# Patient Record
Sex: MALE | Race: WHITE | Employment: FULL TIME | URBAN - METROPOLITAN AREA
[De-identification: names, ages, dates, MRNs, and addresses within clinical notes are randomized per-mention and may not be internally consistent; named-entity substitution may affect disease eponyms.]

---

## 2017-02-25 DIAGNOSIS — I10 ESSENTIAL HYPERTENSION: Primary | ICD-10-CM

## 2017-02-27 RX ORDER — HYDROCHLOROTHIAZIDE 25 MG/1
TABLET ORAL
Qty: 90 TABLET | Refills: 0 | Status: SHIPPED | OUTPATIENT
Start: 2017-02-27 | End: 2017-05-16

## 2017-04-20 ENCOUNTER — PATIENT MESSAGE (OUTPATIENT)
Dept: FAMILY MEDICINE CLINIC | Facility: CLINIC | Age: 37
End: 2017-04-20

## 2017-05-16 DIAGNOSIS — I10 ESSENTIAL HYPERTENSION: Primary | ICD-10-CM

## 2017-05-17 RX ORDER — HYDROCHLOROTHIAZIDE 25 MG/1
TABLET ORAL
Qty: 90 TABLET | Refills: 0 | Status: SHIPPED | OUTPATIENT
Start: 2017-05-17 | End: 2017-08-12

## 2017-05-17 NOTE — TELEPHONE ENCOUNTER
Medication refilled per protocol. LOV 12/23/2016    No future appointments.     Signed Prescriptions Disp Refills    HYDROCHLOROTHIAZIDE 25 MG Oral Tab 90 tablet 0      Sig: TAKE 1 TABLET BY MOUTH DAILY        Authorizing Provider: Annamarie Srivastava

## 2017-08-12 DIAGNOSIS — I10 ESSENTIAL HYPERTENSION: ICD-10-CM

## 2017-08-14 RX ORDER — HYDROCHLOROTHIAZIDE 25 MG/1
TABLET ORAL
Qty: 90 TABLET | Refills: 1 | Status: SHIPPED | OUTPATIENT
Start: 2017-08-14 | End: 2018-02-14

## 2017-08-14 NOTE — TELEPHONE ENCOUNTER
Hypertension Medications Protocol Failed8/12 5:45 PM   - Appointment in past 6 or next 3 months    + CMP or BMP in past 12 months    + Last serum creatinine< 2.0     LOV 12/23/16, last labs 12/6/16- not noted when to return or have repeat labs.   Will you a

## 2017-12-27 ENCOUNTER — TELEPHONE (OUTPATIENT)
Dept: FAMILY MEDICINE CLINIC | Facility: CLINIC | Age: 37
End: 2017-12-27

## 2017-12-27 DIAGNOSIS — I10 ESSENTIAL HYPERTENSION: Primary | ICD-10-CM

## 2017-12-27 DIAGNOSIS — E78.5 HYPERLIPIDEMIA WITH TARGET LDL LESS THAN 130: ICD-10-CM

## 2017-12-27 NOTE — TELEPHONE ENCOUNTER
Patient is scheduled for a physical 01/26/18 and would like to have blood work done prior to appointment.  Please place orders to [facility]

## 2018-01-19 ENCOUNTER — APPOINTMENT (OUTPATIENT)
Dept: LAB | Facility: HOSPITAL | Age: 38
End: 2018-01-19
Attending: FAMILY MEDICINE
Payer: COMMERCIAL

## 2018-01-19 DIAGNOSIS — E78.5 HYPERLIPIDEMIA WITH TARGET LDL LESS THAN 130: ICD-10-CM

## 2018-01-19 DIAGNOSIS — I10 ESSENTIAL HYPERTENSION: ICD-10-CM

## 2018-01-19 LAB
ALBUMIN SERPL-MCNC: 3.9 G/DL (ref 3.5–4.8)
ALP LIVER SERPL-CCNC: 65 U/L (ref 45–117)
ALT SERPL-CCNC: 37 U/L (ref 17–63)
AST SERPL-CCNC: 17 U/L (ref 15–41)
BILIRUB SERPL-MCNC: 0.3 MG/DL (ref 0.1–2)
BUN BLD-MCNC: 23 MG/DL (ref 8–20)
CALCIUM BLD-MCNC: 9 MG/DL (ref 8.3–10.3)
CHLORIDE: 101 MMOL/L (ref 101–111)
CHOLEST SMN-MCNC: 235 MG/DL (ref ?–200)
CO2: 31 MMOL/L (ref 22–32)
CREAT BLD-MCNC: 1.28 MG/DL (ref 0.7–1.3)
GLUCOSE BLD-MCNC: 101 MG/DL (ref 70–99)
HDLC SERPL-MCNC: 43 MG/DL (ref 45–?)
HDLC SERPL: 5.47 {RATIO} (ref ?–4.97)
LDLC SERPL CALC-MCNC: 152 MG/DL (ref ?–130)
M PROTEIN MFR SERPL ELPH: 7.6 G/DL (ref 6.1–8.3)
NONHDLC SERPL-MCNC: 192 MG/DL (ref ?–130)
POTASSIUM SERPL-SCNC: 4.1 MMOL/L (ref 3.6–5.1)
SODIUM SERPL-SCNC: 138 MMOL/L (ref 136–144)
TRIGL SERPL-MCNC: 201 MG/DL (ref ?–150)
VLDLC SERPL CALC-MCNC: 40 MG/DL (ref 5–40)

## 2018-01-19 PROCEDURE — 36415 COLL VENOUS BLD VENIPUNCTURE: CPT

## 2018-01-19 PROCEDURE — 80053 COMPREHEN METABOLIC PANEL: CPT

## 2018-01-19 PROCEDURE — 80061 LIPID PANEL: CPT

## 2018-02-14 DIAGNOSIS — I10 ESSENTIAL HYPERTENSION: ICD-10-CM

## 2018-02-15 RX ORDER — HYDROCHLOROTHIAZIDE 25 MG/1
TABLET ORAL
Qty: 90 TABLET | Refills: 0 | Status: SHIPPED | OUTPATIENT
Start: 2018-02-15 | End: 2018-05-12

## 2018-03-02 ENCOUNTER — OFFICE VISIT (OUTPATIENT)
Dept: FAMILY MEDICINE CLINIC | Facility: CLINIC | Age: 38
End: 2018-03-02

## 2018-03-02 VITALS
HEART RATE: 84 BPM | SYSTOLIC BLOOD PRESSURE: 124 MMHG | TEMPERATURE: 98 F | BODY MASS INDEX: 26.46 KG/M2 | RESPIRATION RATE: 16 BRPM | WEIGHT: 189 LBS | DIASTOLIC BLOOD PRESSURE: 70 MMHG | HEIGHT: 71 IN

## 2018-03-02 DIAGNOSIS — Z82.49 FAMILY HISTORY OF HEART DISEASE: ICD-10-CM

## 2018-03-02 DIAGNOSIS — Z00.00 ANNUAL PHYSICAL EXAM: Primary | ICD-10-CM

## 2018-03-02 DIAGNOSIS — E78.5 HYPERLIPIDEMIA WITH TARGET LDL LESS THAN 130: ICD-10-CM

## 2018-03-02 DIAGNOSIS — I10 ESSENTIAL HYPERTENSION: ICD-10-CM

## 2018-03-02 PROCEDURE — 99395 PREV VISIT EST AGE 18-39: CPT | Performed by: FAMILY MEDICINE

## 2018-03-02 NOTE — PROGRESS NOTES
Patient presents with:  Physical     HPI:   Mary Lou Ash is a 40year old male who presents for a complete physical exam.     Last colonoscopy:  n/a  Last PSA:  n/a  Immunizations: TDaP 8/2014.      HLD - has been on medications in the past.  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 Will postpone right now. Consider Crestor when indicated. 3. Family history of heart disease  As above, several males. Monitoring risk factors. 4. Essential hypertension  BP controlled, stable.          Rik Roy

## 2018-05-12 DIAGNOSIS — I10 ESSENTIAL HYPERTENSION: ICD-10-CM

## 2018-05-14 RX ORDER — HYDROCHLOROTHIAZIDE 25 MG/1
TABLET ORAL
Qty: 90 TABLET | Refills: 1 | Status: SHIPPED | OUTPATIENT
Start: 2018-05-14 | End: 2018-10-16

## 2018-05-14 NOTE — TELEPHONE ENCOUNTER
Incoming request for Hydrochlorothiazide. LOV 3/2/2018 and last labs done 1/2018. No future appointments. Medication passed protocol. Medication sent.

## 2018-10-03 ENCOUNTER — TELEPHONE (OUTPATIENT)
Dept: FAMILY MEDICINE CLINIC | Facility: CLINIC | Age: 38
End: 2018-10-03

## 2018-10-03 DIAGNOSIS — E78.5 HYPERLIPIDEMIA WITH TARGET LDL LESS THAN 130: Primary | ICD-10-CM

## 2018-10-03 NOTE — TELEPHONE ENCOUNTER
Pt scheduled his physical with Dr. Sera Mata on 3/8/19 please call lab orders into [facility]. Thank you.

## 2018-10-16 DIAGNOSIS — I10 ESSENTIAL HYPERTENSION: ICD-10-CM

## 2018-10-17 RX ORDER — HYDROCHLOROTHIAZIDE 25 MG/1
TABLET ORAL
Qty: 90 TABLET | Refills: 1 | Status: SHIPPED | OUTPATIENT
Start: 2018-10-17 | End: 2019-03-08

## 2018-10-17 NOTE — TELEPHONE ENCOUNTER
Requested Prescriptions     Pending Prescriptions Disp Refills   • HYDROCHLOROTHIAZIDE 25 MG Oral Tab [Pharmacy Med Name: HYDROCHLOROTHIAZIDE 25MG TABLETS] 90 tablet 0     Sig: TAKE 1 TABLET BY MOUTH DAILY       LOV Visit 3/2/18    Appointment scheduled: 3

## 2019-03-01 ENCOUNTER — APPOINTMENT (OUTPATIENT)
Dept: LAB | Age: 39
End: 2019-03-01
Attending: FAMILY MEDICINE
Payer: COMMERCIAL

## 2019-03-01 DIAGNOSIS — E78.5 HYPERLIPIDEMIA WITH TARGET LDL LESS THAN 130: ICD-10-CM

## 2019-03-01 LAB
ALBUMIN SERPL-MCNC: 3.9 G/DL (ref 3.4–5)
ALBUMIN/GLOB SERPL: 1.1 {RATIO} (ref 1–2)
ALP LIVER SERPL-CCNC: 73 U/L (ref 45–117)
ALT SERPL-CCNC: 36 U/L (ref 16–61)
ANION GAP SERPL CALC-SCNC: 9 MMOL/L (ref 0–18)
AST SERPL-CCNC: 14 U/L (ref 15–37)
BILIRUB SERPL-MCNC: 0.4 MG/DL (ref 0.1–2)
BUN BLD-MCNC: 21 MG/DL (ref 7–18)
BUN/CREAT SERPL: 15.6 (ref 10–20)
CALCIUM BLD-MCNC: 9.1 MG/DL (ref 8.5–10.1)
CHLORIDE SERPL-SCNC: 103 MMOL/L (ref 98–107)
CHOLEST SMN-MCNC: 249 MG/DL (ref ?–200)
CO2 SERPL-SCNC: 27 MMOL/L (ref 21–32)
CREAT BLD-MCNC: 1.35 MG/DL (ref 0.7–1.3)
GLOBULIN PLAS-MCNC: 3.7 G/DL (ref 2.8–4.4)
GLUCOSE BLD-MCNC: 102 MG/DL (ref 70–99)
HDLC SERPL-MCNC: 37 MG/DL (ref 40–59)
LDLC SERPL CALC-MCNC: 162 MG/DL (ref ?–100)
M PROTEIN MFR SERPL ELPH: 7.6 G/DL (ref 6.4–8.2)
NONHDLC SERPL-MCNC: 212 MG/DL (ref ?–130)
OSMOLALITY SERPL CALC.SUM OF ELEC: 291 MOSM/KG (ref 275–295)
POTASSIUM SERPL-SCNC: 3.7 MMOL/L (ref 3.5–5.1)
SODIUM SERPL-SCNC: 139 MMOL/L (ref 136–145)
TRIGL SERPL-MCNC: 251 MG/DL (ref 30–149)
VLDLC SERPL CALC-MCNC: 50 MG/DL (ref 0–30)

## 2019-03-01 PROCEDURE — 80061 LIPID PANEL: CPT | Performed by: FAMILY MEDICINE

## 2019-03-01 PROCEDURE — 80053 COMPREHEN METABOLIC PANEL: CPT | Performed by: FAMILY MEDICINE

## 2019-03-01 PROCEDURE — 36415 COLL VENOUS BLD VENIPUNCTURE: CPT | Performed by: FAMILY MEDICINE

## 2019-03-08 ENCOUNTER — OFFICE VISIT (OUTPATIENT)
Dept: FAMILY MEDICINE CLINIC | Facility: CLINIC | Age: 39
End: 2019-03-08
Payer: COMMERCIAL

## 2019-03-08 VITALS
TEMPERATURE: 98 F | WEIGHT: 192.19 LBS | SYSTOLIC BLOOD PRESSURE: 120 MMHG | DIASTOLIC BLOOD PRESSURE: 68 MMHG | BODY MASS INDEX: 26.61 KG/M2 | HEART RATE: 72 BPM | HEIGHT: 71.25 IN

## 2019-03-08 DIAGNOSIS — Z00.00 ANNUAL PHYSICAL EXAM: Primary | ICD-10-CM

## 2019-03-08 DIAGNOSIS — Z82.49 FAMILY HISTORY OF HEART DISEASE: ICD-10-CM

## 2019-03-08 DIAGNOSIS — I10 ESSENTIAL HYPERTENSION: ICD-10-CM

## 2019-03-08 DIAGNOSIS — E78.5 HYPERLIPIDEMIA WITH TARGET LDL LESS THAN 130: ICD-10-CM

## 2019-03-08 PROCEDURE — 99395 PREV VISIT EST AGE 18-39: CPT | Performed by: FAMILY MEDICINE

## 2019-03-08 RX ORDER — ROSUVASTATIN CALCIUM 10 MG/1
10 TABLET, COATED ORAL NIGHTLY
Qty: 90 TABLET | Refills: 3 | Status: SHIPPED | OUTPATIENT
Start: 2019-03-08 | End: 2019-12-08

## 2019-03-08 RX ORDER — B-COMPLEX WITH VITAMIN C
TABLET ORAL DAILY
COMMUNITY

## 2019-03-08 RX ORDER — OMEGA-3 FATTY ACIDS CAP DELAYED RELEASE 1000 MG 1000 MG
2 CAPSULE DELAYED RELEASE ORAL DAILY
COMMUNITY
End: 2021-07-30

## 2019-03-08 RX ORDER — ASCORBIC ACID 500 MG
TABLET ORAL DAILY
COMMUNITY

## 2019-03-08 RX ORDER — HYDROCHLOROTHIAZIDE 25 MG/1
25 TABLET ORAL
Qty: 90 TABLET | Refills: 3 | Status: SHIPPED | OUTPATIENT
Start: 2019-03-08 | End: 2020-03-23

## 2019-03-08 NOTE — PROGRESS NOTES
Patient presents with:  Physical     HPI:   Mary Burnette is a 41year old male who presents for a complete physical exam.     Last colonoscopy:  N/a - no family history. At 52yo  Last PSA:  N.a - at 53yo  Immunizations: flu anually. TDaP 2014. Problem Relation Age of Onset   • Heart Disorder Father    • Lipids Father    • Hypertension Father    • Heart Disorder Maternal Grandfather    • Heart Disorder Paternal Grandfather       Social History:  Social History    Tobacco Use      Smoking status seen in the office today:  had concerns including Physical.    1. Annual physical exam  Overall well  Continue healthy diet, exercise. Cancer screening at 49.      2. Essential hypertension  BP controlled  Stable meds. - COMP METABOLIC PANEL (14);  Katty

## 2019-04-10 DIAGNOSIS — I10 ESSENTIAL HYPERTENSION: ICD-10-CM

## 2019-04-10 RX ORDER — HYDROCHLOROTHIAZIDE 25 MG/1
TABLET ORAL
Qty: 90 TABLET | Refills: 0 | OUTPATIENT
Start: 2019-04-10

## 2019-07-02 ENCOUNTER — TELEPHONE (OUTPATIENT)
Dept: FAMILY MEDICINE CLINIC | Facility: CLINIC | Age: 39
End: 2019-07-02

## 2019-07-02 DIAGNOSIS — M79.673 PAIN OF FOOT, UNSPECIFIED LATERALITY: Primary | ICD-10-CM

## 2019-07-02 NOTE — TELEPHONE ENCOUNTER
Having foot pain. Recurrent. Limits walking. Home measures not helping  Want to get him in touch with [facility]. Will place referral.     Can we get him the number and address?

## 2019-07-06 ENCOUNTER — OFFICE VISIT (OUTPATIENT)
Dept: FAMILY MEDICINE CLINIC | Facility: CLINIC | Age: 39
End: 2019-07-06
Payer: COMMERCIAL

## 2019-07-06 VITALS
HEIGHT: 72 IN | TEMPERATURE: 98 F | BODY MASS INDEX: 26.82 KG/M2 | OXYGEN SATURATION: 99 % | SYSTOLIC BLOOD PRESSURE: 160 MMHG | DIASTOLIC BLOOD PRESSURE: 90 MMHG | WEIGHT: 198 LBS | HEART RATE: 74 BPM

## 2019-07-06 DIAGNOSIS — J01.20 ACUTE ETHMOIDAL SINUSITIS, RECURRENCE NOT SPECIFIED: Primary | ICD-10-CM

## 2019-07-06 PROCEDURE — 99213 OFFICE O/P EST LOW 20 MIN: CPT | Performed by: PHYSICIAN ASSISTANT

## 2019-07-06 RX ORDER — FLUTICASONE PROPIONATE 50 MCG
2 SPRAY, SUSPENSION (ML) NASAL DAILY
Qty: 1 BOTTLE | Refills: 0 | Status: SHIPPED | OUTPATIENT
Start: 2019-07-06 | End: 2019-07-20

## 2019-07-06 RX ORDER — AMOXICILLIN AND CLAVULANATE POTASSIUM 875; 125 MG/1; MG/1
1 TABLET, FILM COATED ORAL 2 TIMES DAILY
Qty: 20 TABLET | Refills: 0 | Status: SHIPPED | OUTPATIENT
Start: 2019-07-06 | End: 2019-07-16

## 2019-07-06 NOTE — PROGRESS NOTES
CHIEF COMPLAINT:   Patient presents with:  Sinus Problem      HPI:   Paula Hernandez is a 46year old male who presents for cold symptoms for  2  weeks. Symptoms have progressed into sinus congestion and have been worsening since onset.   The patient r since quittin.8      Smokeless tobacco: Never Used      Tobacco comment: in college, very little    Alcohol use:  Yes      Alcohol/week: 2.4 oz      Types: 4 Standard drinks or equivalent per week      Frequency: 2-3 times a week      Drinks per session Refills for this Visit:  Requested Prescriptions     Signed Prescriptions Disp Refills   • Amoxicillin-Pot Clavulanate 875-125 MG Oral Tab 20 tablet 0     Sig: Take 1 tablet by mouth 2 (two) times daily for 10 days.    • Fluticasone Propionate 50 MCG/ACT Na

## 2019-08-26 ENCOUNTER — APPOINTMENT (OUTPATIENT)
Dept: LAB | Age: 39
End: 2019-08-26
Attending: FAMILY MEDICINE
Payer: COMMERCIAL

## 2019-08-26 DIAGNOSIS — Z82.49 FAMILY HISTORY OF HEART DISEASE: ICD-10-CM

## 2019-08-26 DIAGNOSIS — I10 ESSENTIAL HYPERTENSION: ICD-10-CM

## 2019-08-26 DIAGNOSIS — E78.5 HYPERLIPIDEMIA WITH TARGET LDL LESS THAN 130: ICD-10-CM

## 2019-08-26 LAB
ALBUMIN SERPL-MCNC: 4.1 G/DL (ref 3.4–5)
ALBUMIN/GLOB SERPL: 1.1 {RATIO} (ref 1–2)
ALP LIVER SERPL-CCNC: 66 U/L (ref 45–117)
ALT SERPL-CCNC: 40 U/L (ref 16–61)
ANION GAP SERPL CALC-SCNC: 8 MMOL/L (ref 0–18)
AST SERPL-CCNC: 22 U/L (ref 15–37)
BILIRUB SERPL-MCNC: 0.6 MG/DL (ref 0.1–2)
BUN BLD-MCNC: 17 MG/DL (ref 7–18)
BUN/CREAT SERPL: 12.2 (ref 10–20)
CALCIUM BLD-MCNC: 8.7 MG/DL (ref 8.5–10.1)
CHLORIDE SERPL-SCNC: 103 MMOL/L (ref 98–112)
CHOLEST SMN-MCNC: 156 MG/DL (ref ?–200)
CO2 SERPL-SCNC: 29 MMOL/L (ref 21–32)
CREAT BLD-MCNC: 1.39 MG/DL (ref 0.7–1.3)
GLOBULIN PLAS-MCNC: 3.7 G/DL (ref 2.8–4.4)
GLUCOSE BLD-MCNC: 105 MG/DL (ref 70–99)
HDLC SERPL-MCNC: 41 MG/DL (ref 40–59)
LDLC SERPL CALC-MCNC: 88 MG/DL (ref ?–100)
M PROTEIN MFR SERPL ELPH: 7.8 G/DL (ref 6.4–8.2)
NONHDLC SERPL-MCNC: 115 MG/DL (ref ?–130)
OSMOLALITY SERPL CALC.SUM OF ELEC: 292 MOSM/KG (ref 275–295)
POTASSIUM SERPL-SCNC: 4.1 MMOL/L (ref 3.5–5.1)
SODIUM SERPL-SCNC: 140 MMOL/L (ref 136–145)
TRIGL SERPL-MCNC: 135 MG/DL (ref 30–149)
VLDLC SERPL CALC-MCNC: 27 MG/DL (ref 0–30)

## 2019-08-26 PROCEDURE — 80061 LIPID PANEL: CPT | Performed by: FAMILY MEDICINE

## 2019-08-26 PROCEDURE — 36415 COLL VENOUS BLD VENIPUNCTURE: CPT | Performed by: FAMILY MEDICINE

## 2019-08-26 PROCEDURE — 80053 COMPREHEN METABOLIC PANEL: CPT | Performed by: FAMILY MEDICINE

## 2019-09-04 ENCOUNTER — LAB ENCOUNTER (OUTPATIENT)
Dept: LAB | Age: 39
End: 2019-09-04
Attending: PODIATRIST
Payer: COMMERCIAL

## 2019-09-04 DIAGNOSIS — M10.9 GOUT, UNSPECIFIED: Primary | ICD-10-CM

## 2019-09-04 LAB — URATE SERPL-MCNC: 7.2 MG/DL (ref 3.5–7.2)

## 2019-09-04 PROCEDURE — 84550 ASSAY OF BLOOD/URIC ACID: CPT

## 2019-09-04 PROCEDURE — 36415 COLL VENOUS BLD VENIPUNCTURE: CPT

## 2019-09-16 ENCOUNTER — TELEPHONE (OUTPATIENT)
Dept: FAMILY MEDICINE CLINIC | Facility: CLINIC | Age: 39
End: 2019-09-16

## 2019-09-16 RX ORDER — INDOMETHACIN 50 MG/1
CAPSULE ORAL
Refills: 0 | COMMUNITY
Start: 2019-07-02 | End: 2021-07-30

## 2019-09-16 NOTE — TELEPHONE ENCOUNTER
Patient was given Allopurinol by Dr. Bradley Townsend for a gout and patient wants to make sure it is okay to start taking this. Please contact patient to discuss.

## 2019-11-19 ENCOUNTER — PATIENT MESSAGE (OUTPATIENT)
Dept: FAMILY MEDICINE CLINIC | Facility: CLINIC | Age: 39
End: 2019-11-19

## 2019-11-19 RX ORDER — ZOLPIDEM TARTRATE 10 MG/1
10 TABLET ORAL NIGHTLY
Qty: 30 TABLET | Refills: 0 | Status: SHIPPED | OUTPATIENT
Start: 2019-11-19 | End: 2021-07-30

## 2019-11-19 NOTE — TELEPHONE ENCOUNTER
From: Helene Buckley  To: Alissa Knight MD  Sent: 11/19/2019 10:44 AM CST  Subject: Prescription Question    Hi Dr. Chencho Buck,     I have some international travel coming up and will need help getting my sleep schedule on track when I arrive at my de

## 2019-12-08 DIAGNOSIS — E78.5 HYPERLIPIDEMIA WITH TARGET LDL LESS THAN 130: ICD-10-CM

## 2019-12-08 DIAGNOSIS — Z82.49 FAMILY HISTORY OF HEART DISEASE: ICD-10-CM

## 2019-12-09 RX ORDER — ROSUVASTATIN CALCIUM 10 MG/1
TABLET, COATED ORAL
Qty: 90 TABLET | Refills: 0 | Status: SHIPPED | OUTPATIENT
Start: 2019-12-09 | End: 2020-06-01

## 2019-12-09 NOTE — TELEPHONE ENCOUNTER
Requested Prescriptions     Pending Prescriptions Disp Refills   • ROSUVASTATIN CALCIUM 10 MG Oral Tab [Pharmacy Med Name: ROSUVASTATIN 10MG TABLETS] 90 tablet 0     Sig: TAKE 1 TABLET(10 MG) BY MOUTH EVERY NIGHT     LOV 3/8/2019     Patient was asked to f

## 2020-02-19 ENCOUNTER — TELEPHONE (OUTPATIENT)
Dept: FAMILY MEDICINE CLINIC | Facility: CLINIC | Age: 40
End: 2020-02-19

## 2020-02-19 DIAGNOSIS — E78.5 HYPERLIPIDEMIA WITH TARGET LDL LESS THAN 130: Primary | ICD-10-CM

## 2020-02-19 DIAGNOSIS — Z87.39 HISTORY OF GOUT: ICD-10-CM

## 2020-02-19 DIAGNOSIS — I10 ESSENTIAL HYPERTENSION: ICD-10-CM

## 2020-03-22 DIAGNOSIS — I10 ESSENTIAL HYPERTENSION: ICD-10-CM

## 2020-03-23 ENCOUNTER — TELEPHONE (OUTPATIENT)
Dept: FAMILY MEDICINE CLINIC | Facility: CLINIC | Age: 40
End: 2020-03-23

## 2020-03-23 RX ORDER — HYDROCHLOROTHIAZIDE 25 MG/1
TABLET ORAL
Qty: 90 TABLET | Refills: 0 | Status: SHIPPED | OUTPATIENT
Start: 2020-03-23 | End: 2020-06-16

## 2020-03-23 NOTE — TELEPHONE ENCOUNTER
Requested Prescriptions     Pending Prescriptions Disp Refills   • HYDROCHLOROTHIAZIDE 25 MG Oral Tab [Pharmacy Med Name: HYDROCHLOROTHIAZIDE 25MG TABLETS] 90 tablet 3     Sig: TAKE 1 TABLET(25 MG) BY MOUTH EVERY DAY     LOV Visit date not found         Ap

## 2020-05-31 DIAGNOSIS — E78.5 HYPERLIPIDEMIA WITH TARGET LDL LESS THAN 130: ICD-10-CM

## 2020-05-31 DIAGNOSIS — Z82.49 FAMILY HISTORY OF HEART DISEASE: ICD-10-CM

## 2020-06-01 RX ORDER — ROSUVASTATIN CALCIUM 10 MG/1
TABLET, COATED ORAL
Qty: 90 TABLET | Refills: 0 | Status: SHIPPED | OUTPATIENT
Start: 2020-06-01 | End: 2020-08-25

## 2020-06-15 ENCOUNTER — APPOINTMENT (OUTPATIENT)
Dept: LAB | Age: 40
End: 2020-06-15
Attending: FAMILY MEDICINE
Payer: COMMERCIAL

## 2020-06-15 DIAGNOSIS — E78.5 HYPERLIPIDEMIA WITH TARGET LDL LESS THAN 130: ICD-10-CM

## 2020-06-15 DIAGNOSIS — Z87.39 HISTORY OF GOUT: ICD-10-CM

## 2020-06-15 DIAGNOSIS — I10 ESSENTIAL HYPERTENSION: ICD-10-CM

## 2020-06-15 PROCEDURE — 80061 LIPID PANEL: CPT

## 2020-06-15 PROCEDURE — 84550 ASSAY OF BLOOD/URIC ACID: CPT

## 2020-06-15 PROCEDURE — 80053 COMPREHEN METABOLIC PANEL: CPT

## 2020-06-16 DIAGNOSIS — I10 ESSENTIAL HYPERTENSION: ICD-10-CM

## 2020-06-16 RX ORDER — HYDROCHLOROTHIAZIDE 25 MG/1
TABLET ORAL
Qty: 90 TABLET | Refills: 0 | Status: SHIPPED | OUTPATIENT
Start: 2020-06-16 | End: 2020-09-14

## 2020-06-16 NOTE — TELEPHONE ENCOUNTER
Requested Prescriptions     Pending Prescriptions Disp Refills   • HYDROCHLOROTHIAZIDE 25 MG Oral Tab [Pharmacy Med Name: HYDROCHLOROTHIAZIDE 25MG TABLETS] 90 tablet 0     Sig: TAKE 1 TABLET(25 MG) BY MOUTH EVERY DAY     LOV 3/8/2019    Patient was asked t

## 2020-06-19 ENCOUNTER — OFFICE VISIT (OUTPATIENT)
Dept: FAMILY MEDICINE CLINIC | Facility: CLINIC | Age: 40
End: 2020-06-19
Payer: COMMERCIAL

## 2020-06-19 VITALS
HEART RATE: 83 BPM | SYSTOLIC BLOOD PRESSURE: 128 MMHG | DIASTOLIC BLOOD PRESSURE: 70 MMHG | WEIGHT: 196 LBS | TEMPERATURE: 98 F | RESPIRATION RATE: 18 BRPM | HEIGHT: 71.26 IN | BODY MASS INDEX: 27.14 KG/M2

## 2020-06-19 DIAGNOSIS — I10 ESSENTIAL HYPERTENSION: ICD-10-CM

## 2020-06-19 DIAGNOSIS — Z00.00 ANNUAL PHYSICAL EXAM: Primary | ICD-10-CM

## 2020-06-19 DIAGNOSIS — Z87.39 HISTORY OF GOUT: ICD-10-CM

## 2020-06-19 DIAGNOSIS — E78.5 HYPERLIPIDEMIA WITH TARGET LDL LESS THAN 130: ICD-10-CM

## 2020-06-19 PROCEDURE — 99395 PREV VISIT EST AGE 18-39: CPT | Performed by: FAMILY MEDICINE

## 2020-06-19 NOTE — PROGRESS NOTES
Patient presents with:  Physical: Annual. No concerns      HPI:   Matteo Cao is a 46year old male who presents for a complete physical exam.     Last colonoscopy:  N/a - at 51  Last PSA:  N/a - at 49. Uncle recently diagnosed with prostate CA. (Patient not taking: Reported on 6/19/2020 ) 30 tablet 0   • indomethacin 50 MG Oral Cap   0      Past Medical History:   Diagnosis Date   • Essential hypertension    • Hyperlipidemia       Past Surgical History:   Procedure Laterality Date   • OTHER  2/20 click, rub or gallop, regular rate and rhythm  Abdomen: soft, non-tender; bowel sounds normal; no masses,  no organomegaly  Extremities: extremities normal, atraumatic, no cyanosis or edema  Pulses: 2+ and symmetric  Neurologic: Alert and oriented X 3, nor

## 2020-08-25 DIAGNOSIS — E78.5 HYPERLIPIDEMIA WITH TARGET LDL LESS THAN 130: ICD-10-CM

## 2020-08-25 DIAGNOSIS — Z82.49 FAMILY HISTORY OF HEART DISEASE: ICD-10-CM

## 2020-08-25 RX ORDER — ROSUVASTATIN CALCIUM 10 MG/1
TABLET, COATED ORAL
Qty: 90 TABLET | Refills: 0 | Status: SHIPPED | OUTPATIENT
Start: 2020-08-25 | End: 2020-11-16

## 2020-08-25 NOTE — TELEPHONE ENCOUNTER
Requested Prescriptions     Pending Prescriptions Disp Refills   • ROSUVASTATIN CALCIUM 10 MG Oral Tab [Pharmacy Med Name: ROSUVASTATIN 10MG TABLETS] 90 tablet 0     Sig: TAKE 1 TABLET(10 MG) BY MOUTH EVERY NIGHT     LOV 6/19/2020         Appointment sched

## 2020-09-11 DIAGNOSIS — I10 ESSENTIAL HYPERTENSION: ICD-10-CM

## 2020-09-14 RX ORDER — HYDROCHLOROTHIAZIDE 25 MG/1
TABLET ORAL
Qty: 90 TABLET | Refills: 0 | Status: SHIPPED | OUTPATIENT
Start: 2020-09-14 | End: 2020-12-04

## 2020-09-14 NOTE — TELEPHONE ENCOUNTER
Requested Prescriptions     Signed Prescriptions Disp Refills   • HYDROCHLOROTHIAZIDE 25 MG Oral Tab 90 tablet 0     Sig: TAKE 1 TABLET(25 MG) BY MOUTH EVERY DAY     Authorizing Provider: Dylan Jovel     Ordering User: Marvin Jo 6/19

## 2020-11-16 DIAGNOSIS — E78.5 HYPERLIPIDEMIA WITH TARGET LDL LESS THAN 130: ICD-10-CM

## 2020-11-16 DIAGNOSIS — Z82.49 FAMILY HISTORY OF HEART DISEASE: ICD-10-CM

## 2020-11-16 RX ORDER — ROSUVASTATIN CALCIUM 10 MG/1
TABLET, COATED ORAL
Qty: 90 TABLET | Refills: 0 | Status: SHIPPED | OUTPATIENT
Start: 2020-11-16 | End: 2021-02-13

## 2020-11-16 NOTE — TELEPHONE ENCOUNTER
Requested Prescriptions     Pending Prescriptions Disp Refills   • ROSUVASTATIN CALCIUM 10 MG Oral Tab [Pharmacy Med Name: ROSUVASTATIN 10MG TABLETS] 90 tablet 0     Sig: TAKE 1 TABLET(10 MG) BY MOUTH EVERY NIGHT     LOV 6/19/2020     Patient was asked to

## 2020-12-03 DIAGNOSIS — I10 ESSENTIAL HYPERTENSION: ICD-10-CM

## 2020-12-04 RX ORDER — HYDROCHLOROTHIAZIDE 25 MG/1
TABLET ORAL
Qty: 90 TABLET | Refills: 1 | Status: SHIPPED | OUTPATIENT
Start: 2020-12-04 | End: 2021-05-27

## 2020-12-04 NOTE — TELEPHONE ENCOUNTER
Requested Prescriptions     Pending Prescriptions Disp Refills   • HYDROCHLOROTHIAZIDE 25 MG Oral Tab [Pharmacy Med Name: HYDROCHLOROTHIAZIDE 25MG TABLETS] 90 tablet 0     Sig: TAKE 1 TABLET(25 MG) BY MOUTH EVERY DAY     LOV 6/19/2020     Patient was asked

## 2021-02-13 DIAGNOSIS — E78.5 HYPERLIPIDEMIA WITH TARGET LDL LESS THAN 130: ICD-10-CM

## 2021-02-13 DIAGNOSIS — Z82.49 FAMILY HISTORY OF HEART DISEASE: ICD-10-CM

## 2021-02-13 RX ORDER — ROSUVASTATIN CALCIUM 10 MG/1
TABLET, COATED ORAL
Qty: 90 TABLET | Refills: 2 | Status: SHIPPED | OUTPATIENT
Start: 2021-02-13 | End: 2021-11-15

## 2021-03-23 ENCOUNTER — PATIENT MESSAGE (OUTPATIENT)
Dept: FAMILY MEDICINE CLINIC | Facility: CLINIC | Age: 41
End: 2021-03-23

## 2021-03-23 DIAGNOSIS — Z30.09 VASECTOMY EVALUATION: Primary | ICD-10-CM

## 2021-03-23 NOTE — TELEPHONE ENCOUNTER
LOV 6/19/2020 and at that time, pt was advised to follow up in one year. No future appointments. Pt is requesting a referral to get a vasectomy.     Routed to Dr Jolene Rios

## 2021-03-23 NOTE — TELEPHONE ENCOUNTER
From: Apurva Buckley  To: Joleen Duggan MD  Sent: 3/23/2021 4:52 PM CDT  Subject: Referral Request    Hi Dr. Moises Bundy like to get a vasectomy. Would you be able to give me a referral or let me know of a good place / doctor to get one from?

## 2021-04-13 ENCOUNTER — OFFICE VISIT (OUTPATIENT)
Dept: SURGERY | Facility: CLINIC | Age: 41
End: 2021-04-13
Payer: COMMERCIAL

## 2021-04-13 VITALS — SYSTOLIC BLOOD PRESSURE: 130 MMHG | TEMPERATURE: 97 F | HEART RATE: 79 BPM | DIASTOLIC BLOOD PRESSURE: 78 MMHG

## 2021-04-13 DIAGNOSIS — Z30.09 STERILIZATION CONSULT: Primary | ICD-10-CM

## 2021-04-13 PROCEDURE — 3078F DIAST BP <80 MM HG: CPT | Performed by: UROLOGY

## 2021-04-13 PROCEDURE — 99203 OFFICE O/P NEW LOW 30 MIN: CPT | Performed by: UROLOGY

## 2021-04-13 PROCEDURE — 3075F SYST BP GE 130 - 139MM HG: CPT | Performed by: UROLOGY

## 2021-04-13 NOTE — PROGRESS NOTES
Rooming Clinician:     Mary Lou Ash is a 45year old male.   Patient presents with:  Consult: vasectomy discussion    Vasectomy Consult:  Number of children: 3  Birth Control: Yes  Discussed with spouse: Yes      HPI:       [address] 130/78 (BP Location: Right arm, Patient Position: Sitting, Cuff Size: large)   Pulse 79   Temp 97.3 °F (36.3 °C) (Temporal)   GENERAL: well developed, well nourished,in no apparent distress  SKIN: no rashes,no suspicious lesions  HEENT: atraumatic, normoce Absorbable sutures are placed in the skin. Similar procedure is performed on the opposite side. The procedure generally takes under 30 minutes.   I told the patient he should plan on resting for at least 48 hours afterwards and intermittently place ice pa

## 2021-04-20 ENCOUNTER — TELEPHONE (OUTPATIENT)
Dept: SURGERY | Facility: CLINIC | Age: 41
End: 2021-04-20

## 2021-04-20 NOTE — TELEPHONE ENCOUNTER
Per pt asking for codes and phone number needed to get a price estimate for his vasectomy. Pt asking to call and put into [facility].  Please advise

## 2021-05-05 ENCOUNTER — TELEPHONE (OUTPATIENT)
Dept: SURGERY | Facility: CLINIC | Age: 41
End: 2021-05-05

## 2021-05-05 NOTE — TELEPHONE ENCOUNTER
Patient is scheduled for in office vasectomy CPT [phone] on Friday, May 7th. Per [facility] [phone] CPT 92448 rendered in office does not require prior authorization. Individual deductible is $600.00 - 140.41 accumulation has been met. Once deductible reached - 10% co insurance for patient. Phoned patient and advised of out of pocket cost. Patient acknowledged and wants to proceed with procedure.

## 2021-05-07 ENCOUNTER — PROCEDURE (OUTPATIENT)
Dept: SURGERY | Facility: CLINIC | Age: 41
End: 2021-05-07
Payer: COMMERCIAL

## 2021-05-07 VITALS — SYSTOLIC BLOOD PRESSURE: 167 MMHG | TEMPERATURE: 97 F | DIASTOLIC BLOOD PRESSURE: 100 MMHG | HEART RATE: 111 BPM

## 2021-05-07 DIAGNOSIS — Z98.52 STATUS POST VASECTOMY: Primary | ICD-10-CM

## 2021-05-07 DIAGNOSIS — Z30.2 ENCOUNTER FOR STERILIZATION: ICD-10-CM

## 2021-05-07 PROCEDURE — 55250 REMOVAL OF SPERM DUCT(S): CPT | Performed by: UROLOGY

## 2021-05-07 PROCEDURE — 3077F SYST BP >= 140 MM HG: CPT | Performed by: UROLOGY

## 2021-05-07 PROCEDURE — 3080F DIAST BP >= 90 MM HG: CPT | Performed by: UROLOGY

## 2021-05-07 RX ORDER — HYDROCODONE BITARTRATE AND ACETAMINOPHEN 5; 325 MG/1; MG/1
1-2 TABLET ORAL EVERY 4 HOURS PRN
Qty: 20 TABLET | Refills: 0 | Status: SHIPPED | OUTPATIENT
Start: 2021-05-07 | End: 2021-07-30

## 2021-05-07 NOTE — PROGRESS NOTES
The patient had been previously counseled and examined. The procedure of male sterilization was thoroughly discussed with the patient including alternatives, risks, benefits and complications and he understands and wishes to proceed.     The patient was br

## 2021-05-26 ENCOUNTER — TELEPHONE (OUTPATIENT)
Dept: FAMILY MEDICINE CLINIC | Facility: CLINIC | Age: 41
End: 2021-05-26

## 2021-05-26 DIAGNOSIS — I10 ESSENTIAL HYPERTENSION: ICD-10-CM

## 2021-05-27 ENCOUNTER — TELEPHONE (OUTPATIENT)
Dept: FAMILY MEDICINE CLINIC | Facility: CLINIC | Age: 41
End: 2021-05-27

## 2021-05-27 DIAGNOSIS — E78.5 HYPERLIPIDEMIA WITH TARGET LDL LESS THAN 130: ICD-10-CM

## 2021-05-27 DIAGNOSIS — Z87.39 HISTORY OF GOUT: ICD-10-CM

## 2021-05-27 DIAGNOSIS — I10 ESSENTIAL HYPERTENSION: Primary | ICD-10-CM

## 2021-05-27 RX ORDER — HYDROCHLOROTHIAZIDE 25 MG/1
TABLET ORAL
Qty: 90 TABLET | Refills: 0 | Status: SHIPPED | OUTPATIENT
Start: 2021-05-27 | End: 2021-08-25

## 2021-05-27 NOTE — TELEPHONE ENCOUNTER
Please enter lab orders for the patient's upcoming physical appointment. Physical scheduled:    Your appointments     Date & Time Appointment Department [facility])    Jul 30, 2021  8:30 AM CDT Physical - Established with Rickie Christy  [facility] [place]

## 2021-05-27 NOTE — TELEPHONE ENCOUNTER
Pt returned call advised of notes. Pt scheduled 07/30/21 @ 8:30 am with Dr. Bronson Horta. Pt states he will need medication to get him to appointment pt only has three tablets left.

## 2021-05-27 NOTE — TELEPHONE ENCOUNTER
Called pt advised labs have been ordered. Advised labs are fasting and to fast for 10-12 hours. Pt verbalized understanding.

## 2021-07-22 ENCOUNTER — LAB ENCOUNTER (OUTPATIENT)
Dept: LAB | Age: 41
End: 2021-07-22
Attending: FAMILY MEDICINE
Payer: COMMERCIAL

## 2021-07-22 DIAGNOSIS — Z87.39 HISTORY OF GOUT: ICD-10-CM

## 2021-07-22 DIAGNOSIS — I10 ESSENTIAL HYPERTENSION: ICD-10-CM

## 2021-07-22 DIAGNOSIS — E78.5 HYPERLIPIDEMIA WITH TARGET LDL LESS THAN 130: ICD-10-CM

## 2021-07-22 LAB
ALBUMIN SERPL-MCNC: 4 G/DL (ref 3.4–5)
ALBUMIN/GLOB SERPL: 1.1 {RATIO} (ref 1–2)
ALP LIVER SERPL-CCNC: 74 U/L
ALT SERPL-CCNC: 45 U/L
ANION GAP SERPL CALC-SCNC: 6 MMOL/L (ref 0–18)
AST SERPL-CCNC: 22 U/L (ref 15–37)
BILIRUB SERPL-MCNC: 0.3 MG/DL (ref 0.1–2)
BUN BLD-MCNC: 19 MG/DL (ref 7–18)
BUN/CREAT SERPL: 15.1 (ref 10–20)
CALCIUM BLD-MCNC: 8.8 MG/DL (ref 8.5–10.1)
CHLORIDE SERPL-SCNC: 104 MMOL/L (ref 98–112)
CHOLEST SMN-MCNC: 168 MG/DL (ref ?–200)
CO2 SERPL-SCNC: 27 MMOL/L (ref 21–32)
CREAT BLD-MCNC: 1.26 MG/DL
GLOBULIN PLAS-MCNC: 3.6 G/DL (ref 2.8–4.4)
GLUCOSE BLD-MCNC: 102 MG/DL (ref 70–99)
HDLC SERPL-MCNC: 41 MG/DL (ref 40–59)
LDLC SERPL CALC-MCNC: 98 MG/DL (ref ?–100)
M PROTEIN MFR SERPL ELPH: 7.6 G/DL (ref 6.4–8.2)
NONHDLC SERPL-MCNC: 127 MG/DL (ref ?–130)
OSMOLALITY SERPL CALC.SUM OF ELEC: 286 MOSM/KG (ref 275–295)
PATIENT FASTING Y/N/NP: YES
PATIENT FASTING Y/N/NP: YES
POTASSIUM SERPL-SCNC: 4 MMOL/L (ref 3.5–5.1)
SODIUM SERPL-SCNC: 137 MMOL/L (ref 136–145)
TRIGL SERPL-MCNC: 166 MG/DL (ref 30–149)
URATE SERPL-MCNC: 7.5 MG/DL
VLDLC SERPL CALC-MCNC: 28 MG/DL (ref 0–30)

## 2021-07-22 PROCEDURE — 80061 LIPID PANEL: CPT

## 2021-07-22 PROCEDURE — 80053 COMPREHEN METABOLIC PANEL: CPT

## 2021-07-22 PROCEDURE — 36415 COLL VENOUS BLD VENIPUNCTURE: CPT

## 2021-07-22 PROCEDURE — 84550 ASSAY OF BLOOD/URIC ACID: CPT

## 2021-07-30 ENCOUNTER — OFFICE VISIT (OUTPATIENT)
Dept: FAMILY MEDICINE CLINIC | Facility: CLINIC | Age: 41
End: 2021-07-30
Payer: COMMERCIAL

## 2021-07-30 VITALS
HEIGHT: 71.25 IN | DIASTOLIC BLOOD PRESSURE: 72 MMHG | TEMPERATURE: 98 F | RESPIRATION RATE: 16 BRPM | OXYGEN SATURATION: 99 % | SYSTOLIC BLOOD PRESSURE: 120 MMHG | HEART RATE: 74 BPM | WEIGHT: 187.63 LBS | BODY MASS INDEX: 25.98 KG/M2

## 2021-07-30 DIAGNOSIS — I10 ESSENTIAL HYPERTENSION: ICD-10-CM

## 2021-07-30 DIAGNOSIS — R73.01 ELEVATED FASTING GLUCOSE: ICD-10-CM

## 2021-07-30 DIAGNOSIS — E78.5 HYPERLIPIDEMIA WITH TARGET LDL LESS THAN 130: ICD-10-CM

## 2021-07-30 DIAGNOSIS — Z00.00 ANNUAL PHYSICAL EXAM: Primary | ICD-10-CM

## 2021-07-30 PROCEDURE — 99396 PREV VISIT EST AGE 40-64: CPT | Performed by: FAMILY MEDICINE

## 2021-07-30 PROCEDURE — 3078F DIAST BP <80 MM HG: CPT | Performed by: FAMILY MEDICINE

## 2021-07-30 PROCEDURE — 3074F SYST BP LT 130 MM HG: CPT | Performed by: FAMILY MEDICINE

## 2021-07-30 PROCEDURE — 3008F BODY MASS INDEX DOCD: CPT | Performed by: FAMILY MEDICINE

## 2021-07-30 NOTE — PROGRESS NOTES
Patient presents with:  Physical: Annual      HPI:   Ortega Cameron is a 37year old male who presents for a complete physical exam.     Last colonoscopy:  N/a - screening at 47yo   Last PSA:  N/a - screening at 51   Immunizations: COVID UTD x 2.  Fl Problem Relation Age of Onset   • Heart Disorder Father    • Lipids Father    • Hypertension Father    • Heart Disorder Maternal Grandfather    • Heart Disorder Paternal Grandfather       Social History:  Social History    Tobacco Use      Smoking status ASSESSMENT AND PLAN:     Nellydana Cortes was seen in the office today:  had concerns including Physical (Annual ).     1. Annual physical exam  Overall well  Colon cancer screening at 40  Immunizations up-to-date  Continue to watch diet and exercis

## 2021-08-03 ENCOUNTER — LAB ENCOUNTER (OUTPATIENT)
Dept: LAB | Facility: HOSPITAL | Age: 41
End: 2021-08-03
Attending: UROLOGY
Payer: COMMERCIAL

## 2021-08-03 DIAGNOSIS — Z98.52 STATUS POST VASECTOMY: ICD-10-CM

## 2021-08-03 PROCEDURE — 89310 SEMEN ANALYSIS W/COUNT: CPT

## 2021-08-25 DIAGNOSIS — I10 ESSENTIAL HYPERTENSION: ICD-10-CM

## 2021-08-25 RX ORDER — HYDROCHLOROTHIAZIDE 25 MG/1
TABLET ORAL
Qty: 90 TABLET | Refills: 3 | Status: SHIPPED | OUTPATIENT
Start: 2021-08-25

## 2021-08-25 NOTE — TELEPHONE ENCOUNTER
Requested Prescriptions     Pending Prescriptions Disp Refills   • HYDROCHLOROTHIAZIDE 25 MG Oral Tab [Pharmacy Med Name: HYDROCHLOROTHIAZIDE 25MG TABLETS] 90 tablet 0     Sig: TAKE 1 TABLET(25 MG) BY MOUTH EVERY DAY     LOV 7/30/2021     Patient was asked
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
## 2021-11-12 DIAGNOSIS — Z82.49 FAMILY HISTORY OF HEART DISEASE: ICD-10-CM

## 2021-11-12 DIAGNOSIS — E78.5 HYPERLIPIDEMIA WITH TARGET LDL LESS THAN 130: ICD-10-CM

## 2021-11-15 RX ORDER — ROSUVASTATIN CALCIUM 10 MG/1
TABLET, COATED ORAL
Qty: 90 TABLET | Refills: 2 | Status: SHIPPED | OUTPATIENT
Start: 2021-11-15

## 2021-11-15 NOTE — TELEPHONE ENCOUNTER
Requested Prescriptions     Pending Prescriptions Disp Refills   • ROSUVASTATIN 10 MG Oral Tab [Pharmacy Med Name: ROSUVASTATIN 10MG TABLETS] 90 tablet 2     Sig: TAKE 1 TABLET(10 MG) BY MOUTH EVERY NIGHT     LOV 7/30/2021     Patient was asked to follow-u

## 2022-03-02 ENCOUNTER — TELEPHONE (OUTPATIENT)
Dept: FAMILY MEDICINE CLINIC | Facility: CLINIC | Age: 42
End: 2022-03-02

## 2022-03-02 RX ORDER — ROSUVASTATIN CALCIUM 10 MG/1
10 TABLET, COATED ORAL NIGHTLY
Qty: 90 TABLET | Refills: 2 | Status: SHIPPED | OUTPATIENT
Start: 2022-03-02

## 2022-03-02 RX ORDER — HYDROCHLOROTHIAZIDE 25 MG/1
25 TABLET ORAL DAILY
Qty: 90 TABLET | Refills: 3 | Status: SHIPPED | OUTPATIENT
Start: 2022-03-02

## 2022-03-02 NOTE — TELEPHONE ENCOUNTER
Pt called he is calling because he has a new prescription sent to [facility] mail order for his new insurance for medications:    Hydrochlorothiazide 25 mg  Rosuvastatin 10 mg

## 2022-04-25 ENCOUNTER — OFFICE VISIT (OUTPATIENT)
Dept: SURGERY | Facility: CLINIC | Age: 42
End: 2022-04-25
Payer: COMMERCIAL

## 2022-04-25 DIAGNOSIS — R82.90 URINE FINDING: Primary | ICD-10-CM

## 2022-04-25 DIAGNOSIS — S30.22XA CONTUSION OF SCROTUM, INITIAL ENCOUNTER: ICD-10-CM

## 2022-04-25 LAB
APPEARANCE: CLEAR
BILIRUBIN: NEGATIVE
GLUCOSE (URINE DIPSTICK): NEGATIVE MG/DL
KETONES (URINE DIPSTICK): NEGATIVE MG/DL
LEUKOCYTES: NEGATIVE
MULTISTIX LOT#: NORMAL NUMERIC
NITRITE, URINE: NEGATIVE
OCCULT BLOOD: NEGATIVE
PH, URINE: 7 (ref 4.5–8)
PROTEIN (URINE DIPSTICK): NEGATIVE MG/DL
SPECIFIC GRAVITY: 1.01 (ref 1–1.03)
URINE-COLOR: YELLOW
UROBILINOGEN,SEMI-QN: 0.2 MG/DL (ref 0–1.9)

## 2022-07-16 ENCOUNTER — TELEPHONE (OUTPATIENT)
Dept: FAMILY MEDICINE CLINIC | Facility: CLINIC | Age: 42
End: 2022-07-16

## 2022-07-16 DIAGNOSIS — R73.01 ELEVATED FASTING GLUCOSE: ICD-10-CM

## 2022-07-16 DIAGNOSIS — I10 PRIMARY HYPERTENSION: ICD-10-CM

## 2022-07-16 DIAGNOSIS — E78.5 HYPERLIPIDEMIA WITH TARGET LDL LESS THAN 130: Primary | ICD-10-CM

## 2022-07-16 NOTE — TELEPHONE ENCOUNTER
Please enter lab orders for the patient's upcoming physical appointment. Physical scheduled: Your appointments     Date & Time Appointment Department [facility])    Sep 12, 2022  7:30 AM CDT Adult Physical with Brigido Kraus  [address]  ([address])    PLEASE NOTE - Most insurances allow a Complete Physical once every 366 days. Please schedule accordingly. Please arrive 15 minutes prior to your scheduled appointment. Please also bring your Insurance card, Photo ID, and your medication bottles or a list of your current medication. If you no longer require this appointment, please contact your physician office to cancel. Eren Torres [address] [address] [phone]         Preferred lab: [address] [facility])     The patient has been notified to complete fasting labs prior to their physical appointment.

## 2022-07-17 NOTE — TELEPHONE ENCOUNTER
Labs have been ordered. Please get them fasting prior to the appt.   thanks conducted a detailed discussion... I had a detailed discussion with the patient and/or guardian regarding the historical points, exam findings, and any diagnostic results supporting the discharge/admit diagnosis.

## 2022-10-10 ENCOUNTER — LAB ENCOUNTER (OUTPATIENT)
Dept: LAB | Age: 42
End: 2022-10-10
Attending: FAMILY MEDICINE
Payer: COMMERCIAL

## 2022-10-10 DIAGNOSIS — R73.01 ELEVATED FASTING GLUCOSE: ICD-10-CM

## 2022-10-10 DIAGNOSIS — E78.5 HYPERLIPIDEMIA WITH TARGET LDL LESS THAN 130: ICD-10-CM

## 2022-10-10 DIAGNOSIS — I10 PRIMARY HYPERTENSION: ICD-10-CM

## 2022-10-10 LAB
ALBUMIN SERPL-MCNC: 4 G/DL (ref 3.4–5)
ALBUMIN/GLOB SERPL: 1.1 {RATIO} (ref 1–2)
ALP LIVER SERPL-CCNC: 69 U/L
ALT SERPL-CCNC: 46 U/L
ANION GAP SERPL CALC-SCNC: 8 MMOL/L (ref 0–18)
AST SERPL-CCNC: 33 U/L (ref 15–37)
BILIRUB SERPL-MCNC: 0.5 MG/DL (ref 0.1–2)
BUN BLD-MCNC: 17 MG/DL (ref 7–18)
CALCIUM BLD-MCNC: 9.3 MG/DL (ref 8.5–10.1)
CHLORIDE SERPL-SCNC: 103 MMOL/L (ref 98–112)
CHOLEST SERPL-MCNC: 178 MG/DL (ref ?–200)
CO2 SERPL-SCNC: 28 MMOL/L (ref 21–32)
CREAT BLD-MCNC: 1.35 MG/DL
EST. AVERAGE GLUCOSE BLD GHB EST-MCNC: 117 MG/DL (ref 68–126)
FASTING PATIENT LIPID ANSWER: YES
FASTING STATUS PATIENT QL REPORTED: YES
GFR SERPLBLD BASED ON 1.73 SQ M-ARVRAT: 68 ML/MIN/1.73M2 (ref 60–?)
GLOBULIN PLAS-MCNC: 3.6 G/DL (ref 2.8–4.4)
GLUCOSE BLD-MCNC: 101 MG/DL (ref 70–99)
HBA1C MFR BLD: 5.7 % (ref ?–5.7)
HDLC SERPL-MCNC: 43 MG/DL (ref 40–59)
LDLC SERPL CALC-MCNC: 109 MG/DL (ref ?–100)
NONHDLC SERPL-MCNC: 135 MG/DL (ref ?–130)
OSMOLALITY SERPL CALC.SUM OF ELEC: 290 MOSM/KG (ref 275–295)
POTASSIUM SERPL-SCNC: 3.9 MMOL/L (ref 3.5–5.1)
PROT SERPL-MCNC: 7.6 G/DL (ref 6.4–8.2)
SODIUM SERPL-SCNC: 139 MMOL/L (ref 136–145)
TRIGL SERPL-MCNC: 149 MG/DL (ref 30–149)
VLDLC SERPL CALC-MCNC: 25 MG/DL (ref 0–30)

## 2022-10-10 PROCEDURE — 83036 HEMOGLOBIN GLYCOSYLATED A1C: CPT

## 2022-10-10 PROCEDURE — 80053 COMPREHEN METABOLIC PANEL: CPT

## 2022-10-10 PROCEDURE — 80061 LIPID PANEL: CPT

## 2022-10-10 PROCEDURE — 36415 COLL VENOUS BLD VENIPUNCTURE: CPT

## 2022-10-18 ENCOUNTER — OFFICE VISIT (OUTPATIENT)
Dept: FAMILY MEDICINE CLINIC | Facility: CLINIC | Age: 42
End: 2022-10-18
Payer: COMMERCIAL

## 2022-10-18 VITALS
HEIGHT: 72 IN | TEMPERATURE: 99 F | HEART RATE: 82 BPM | RESPIRATION RATE: 16 BRPM | WEIGHT: 195 LBS | OXYGEN SATURATION: 98 % | SYSTOLIC BLOOD PRESSURE: 112 MMHG | BODY MASS INDEX: 26.41 KG/M2 | DIASTOLIC BLOOD PRESSURE: 74 MMHG

## 2022-10-18 DIAGNOSIS — Z00.00 ANNUAL PHYSICAL EXAM: Primary | ICD-10-CM

## 2022-10-18 DIAGNOSIS — I10 PRIMARY HYPERTENSION: ICD-10-CM

## 2022-10-18 DIAGNOSIS — R73.01 ELEVATED FASTING GLUCOSE: ICD-10-CM

## 2022-10-18 DIAGNOSIS — E78.5 HYPERLIPIDEMIA WITH TARGET LDL LESS THAN 130: ICD-10-CM

## 2022-10-18 PROCEDURE — 3074F SYST BP LT 130 MM HG: CPT | Performed by: FAMILY MEDICINE

## 2022-10-18 PROCEDURE — 3078F DIAST BP <80 MM HG: CPT | Performed by: FAMILY MEDICINE

## 2022-10-18 PROCEDURE — 3008F BODY MASS INDEX DOCD: CPT | Performed by: FAMILY MEDICINE

## 2022-10-18 PROCEDURE — 99396 PREV VISIT EST AGE 40-64: CPT | Performed by: FAMILY MEDICINE

## 2022-11-21 DIAGNOSIS — Z82.49 FAMILY HISTORY OF HEART DISEASE: ICD-10-CM

## 2022-11-21 DIAGNOSIS — E78.5 HYPERLIPIDEMIA WITH TARGET LDL LESS THAN 130: ICD-10-CM

## 2022-11-21 RX ORDER — ROSUVASTATIN CALCIUM 10 MG/1
TABLET, COATED ORAL
Qty: 90 TABLET | Refills: 2 | Status: SHIPPED | OUTPATIENT
Start: 2022-11-21

## 2023-01-08 ENCOUNTER — HOSPITAL ENCOUNTER (OUTPATIENT)
Age: 43
Discharge: HOME | End: 2023-01-08
Payer: COMMERCIAL

## 2023-01-08 VITALS
OXYGEN SATURATION: 98 % | SYSTOLIC BLOOD PRESSURE: 155 MMHG | TEMPERATURE: 97 F | BODY MASS INDEX: 25.73 KG/M2 | HEART RATE: 110 BPM | RESPIRATION RATE: 16 BRPM | DIASTOLIC BLOOD PRESSURE: 97 MMHG | HEIGHT: 72 IN | WEIGHT: 190 LBS

## 2023-01-08 DIAGNOSIS — R05.8 POST-VIRAL COUGH SYNDROME: Primary | ICD-10-CM

## 2023-01-08 DIAGNOSIS — Z20.822 ENCOUNTER FOR LABORATORY TESTING FOR COVID-19 VIRUS: ICD-10-CM

## 2023-01-08 LAB
POCT INFLUENZA A: NEGATIVE
POCT INFLUENZA B: NEGATIVE
SARS-COV-2 RNA RESP QL NAA+PROBE: NOT DETECTED

## 2023-01-08 RX ORDER — CODEINE PHOSPHATE AND GUAIFENESIN 10; 100 MG/5ML; MG/5ML
5 SOLUTION ORAL EVERY 6 HOURS PRN
Qty: 180 ML | Refills: 0 | Status: SHIPPED | OUTPATIENT
Start: 2023-01-08

## 2023-01-08 RX ORDER — ALBUTEROL SULFATE 90 UG/1
2 AEROSOL, METERED RESPIRATORY (INHALATION) EVERY 4 HOURS PRN
Qty: 18 G | Refills: 0 | Status: SHIPPED | OUTPATIENT
Start: 2023-01-08

## 2023-01-08 RX ORDER — CODEINE PHOSPHATE AND GUAIFENESIN 10; 100 MG/5ML; MG/5ML
5 SOLUTION ORAL EVERY 6 HOURS PRN
Qty: 180 ML | Refills: 0 | Status: SHIPPED | OUTPATIENT
Start: 2023-01-08 | End: 2023-01-08

## 2023-01-08 RX ORDER — METHYLPREDNISOLONE 4 MG/1
TABLET ORAL
Qty: 21 TABLET | Refills: 0 | Status: SHIPPED | OUTPATIENT
Start: 2023-01-08

## 2023-01-08 NOTE — DISCHARGE INSTRUCTIONS
Drink plenty fluids  During the day, you may use Mucinex DM one tab every 12 hours    Also consider using inhaler every 4 hours   Take steroids as prescribed. Follow-up with your family doctor in 2-3 days if no better  Return to ED for any worsening or persisting symptoms, shortness of breath, chest pain, dizziness, fever.

## 2023-02-28 ENCOUNTER — TELEPHONE (OUTPATIENT)
Dept: FAMILY MEDICINE CLINIC | Facility: CLINIC | Age: 43
End: 2023-02-28

## 2023-02-28 DIAGNOSIS — I10 ESSENTIAL HYPERTENSION: ICD-10-CM

## 2023-03-01 RX ORDER — HYDROCHLOROTHIAZIDE 25 MG/1
TABLET ORAL
Qty: 90 TABLET | Refills: 2 | Status: SHIPPED | OUTPATIENT
Start: 2023-03-01

## 2023-03-01 NOTE — TELEPHONE ENCOUNTER
[facility] is calling they are asking for update, let them know it is in pending status.  [facility] [phone] for his physical.

## 2023-03-02 ENCOUNTER — TELEPHONE (OUTPATIENT)
Dept: FAMILY MEDICINE CLINIC | Facility: CLINIC | Age: 43
End: 2023-03-02

## 2023-08-14 ENCOUNTER — PATIENT MESSAGE (OUTPATIENT)
Dept: FAMILY MEDICINE CLINIC | Facility: CLINIC | Age: 43
End: 2023-08-14

## 2023-08-14 NOTE — TELEPHONE ENCOUNTER
Last refilled on 5/29/23- next refill not due till 8/29/23  Pt will count pills left and call back if an issue

## 2023-08-15 DIAGNOSIS — Z82.49 FAMILY HISTORY OF HEART DISEASE: ICD-10-CM

## 2023-08-15 DIAGNOSIS — E78.5 HYPERLIPIDEMIA WITH TARGET LDL LESS THAN 130: ICD-10-CM

## 2023-08-16 RX ORDER — ROSUVASTATIN CALCIUM 10 MG/1
TABLET, COATED ORAL
Qty: 90 TABLET | Refills: 0 | Status: SHIPPED | OUTPATIENT
Start: 2023-08-16

## 2023-08-16 NOTE — TELEPHONE ENCOUNTER
Requested Prescriptions     Pending Prescriptions Disp Refills    ROSUVASTATIN 10 MG Oral Tab [Pharmacy Med Name: ROSUVASTATIN TABS 10MG] 90 tablet 3     Sig: TAKE 1 TABLET NIGHTLY     LOV 10/18/2022     Patient was asked to follow-up in: Follow-up not documented in note    Appointment scheduled: Visit date not found     Medication refilled per protocol.

## 2023-09-08 ENCOUNTER — TELEPHONE (OUTPATIENT)
Dept: FAMILY MEDICINE CLINIC | Facility: CLINIC | Age: 43
End: 2023-09-08

## 2023-09-08 DIAGNOSIS — G47.00 INSOMNIA: ICD-10-CM

## 2023-09-08 RX ORDER — ZOLPIDEM TARTRATE 10 MG/1
10 TABLET ORAL NIGHTLY
Qty: 30 TABLET | Refills: 1 | Status: CANCELLED | OUTPATIENT
Start: 2023-09-08

## 2023-09-13 ENCOUNTER — TELEPHONE (OUTPATIENT)
Dept: FAMILY MEDICINE CLINIC | Facility: CLINIC | Age: 43
End: 2023-09-13

## 2023-09-13 DIAGNOSIS — R73.01 ELEVATED FASTING GLUCOSE: ICD-10-CM

## 2023-09-13 DIAGNOSIS — E78.5 HYPERLIPIDEMIA WITH TARGET LDL LESS THAN 130: Primary | ICD-10-CM

## 2023-11-13 ENCOUNTER — TELEPHONE (OUTPATIENT)
Dept: FAMILY MEDICINE CLINIC | Facility: CLINIC | Age: 43
End: 2023-11-13

## 2023-11-13 DIAGNOSIS — Z82.49 FAMILY HISTORY OF HEART DISEASE: ICD-10-CM

## 2023-11-13 DIAGNOSIS — I10 ESSENTIAL HYPERTENSION: ICD-10-CM

## 2023-11-13 DIAGNOSIS — E78.5 HYPERLIPIDEMIA WITH TARGET LDL LESS THAN 130: ICD-10-CM

## 2023-11-17 RX ORDER — HYDROCHLOROTHIAZIDE 25 MG/1
25 TABLET ORAL DAILY
Qty: 90 TABLET | Refills: 0 | Status: SHIPPED | OUTPATIENT
Start: 2023-11-17

## 2023-11-17 RX ORDER — ROSUVASTATIN CALCIUM 10 MG/1
TABLET, COATED ORAL
Qty: 90 TABLET | Refills: 0 | Status: SHIPPED | OUTPATIENT
Start: 2023-11-17

## 2023-11-17 NOTE — TELEPHONE ENCOUNTER
Medication refilled per protocol. Requested Prescriptions     Pending Prescriptions Disp Refills    hydroCHLOROthiazide 25 MG Oral Tab 90 tablet 0     Sig: Take 1 tablet (25 mg total) by mouth daily. Signed Prescriptions Disp Refills    rosuvastatin 10 MG Oral Tab 90 tablet 0     Sig: TAKE 1 TABLET NIGHTLY     Authorizing Provider: Tonya Miller     Ordering User: Lisa Garcia 10/18/22     Future Appointments   Date Time Provider Tamy Guy   1/5/2024  8:00 AM Perez Diamond MD [facility]      Spoke to pt and advised that he get labs done prior to upcoming appt. He agreed with the plan.

## 2023-11-20 DIAGNOSIS — I10 ESSENTIAL HYPERTENSION: ICD-10-CM

## 2023-11-20 RX ORDER — HYDROCHLOROTHIAZIDE 25 MG/1
25 TABLET ORAL DAILY
Qty: 90 TABLET | Refills: 3 | OUTPATIENT
Start: 2023-11-20

## 2024-01-03 ENCOUNTER — TELEPHONE (OUTPATIENT)
Dept: FAMILY MEDICINE CLINIC | Facility: CLINIC | Age: 44
End: 2024-01-03

## 2024-01-03 ENCOUNTER — LAB ENCOUNTER (OUTPATIENT)
Dept: LAB | Age: 44
End: 2024-01-03
Attending: FAMILY MEDICINE
Payer: COMMERCIAL

## 2024-01-03 DIAGNOSIS — R73.01 ELEVATED FASTING GLUCOSE: ICD-10-CM

## 2024-01-03 DIAGNOSIS — E78.5 HYPERLIPIDEMIA WITH TARGET LDL LESS THAN 130: ICD-10-CM

## 2024-01-03 LAB
ALBUMIN SERPL-MCNC: 4.2 G/DL (ref 3.4–5)
ALBUMIN/GLOB SERPL: 1.3 {RATIO} (ref 1–2)
ALP LIVER SERPL-CCNC: 70 U/L
ALT SERPL-CCNC: 45 U/L
ANION GAP SERPL CALC-SCNC: 2 MMOL/L (ref 0–18)
AST SERPL-CCNC: 22 U/L (ref 15–37)
BILIRUB SERPL-MCNC: 0.4 MG/DL (ref 0.1–2)
BUN BLD-MCNC: 18 MG/DL (ref 9–23)
CALCIUM BLD-MCNC: 9.2 MG/DL (ref 8.5–10.1)
CHLORIDE SERPL-SCNC: 103 MMOL/L (ref 98–112)
CHOLEST SERPL-MCNC: 192 MG/DL (ref ?–200)
CO2 SERPL-SCNC: 33 MMOL/L (ref 21–32)
CREAT BLD-MCNC: 1.23 MG/DL
EGFRCR SERPLBLD CKD-EPI 2021: 75 ML/MIN/1.73M2 (ref 60–?)
EST. AVERAGE GLUCOSE BLD GHB EST-MCNC: 120 MG/DL (ref 68–126)
FASTING PATIENT LIPID ANSWER: YES
FASTING STATUS PATIENT QL REPORTED: YES
GLOBULIN PLAS-MCNC: 3.3 G/DL (ref 2.8–4.4)
GLUCOSE BLD-MCNC: 103 MG/DL (ref 70–99)
HBA1C MFR BLD: 5.8 % (ref ?–5.7)
HDLC SERPL-MCNC: 45 MG/DL (ref 40–59)
LDLC SERPL CALC-MCNC: 117 MG/DL (ref ?–100)
NONHDLC SERPL-MCNC: 147 MG/DL (ref ?–130)
OSMOLALITY SERPL CALC.SUM OF ELEC: 288 MOSM/KG (ref 275–295)
POTASSIUM SERPL-SCNC: 3.9 MMOL/L (ref 3.5–5.1)
PROT SERPL-MCNC: 7.5 G/DL (ref 6.4–8.2)
SODIUM SERPL-SCNC: 138 MMOL/L (ref 136–145)
TRIGL SERPL-MCNC: 172 MG/DL (ref 30–149)
VLDLC SERPL CALC-MCNC: 30 MG/DL (ref 0–30)

## 2024-01-03 PROCEDURE — 80053 COMPREHEN METABOLIC PANEL: CPT

## 2024-01-03 PROCEDURE — 80061 LIPID PANEL: CPT

## 2024-01-03 PROCEDURE — 83036 HEMOGLOBIN GLYCOSYLATED A1C: CPT

## 2024-01-03 PROCEDURE — 36415 COLL VENOUS BLD VENIPUNCTURE: CPT

## 2024-01-03 NOTE — TELEPHONE ENCOUNTER
Dr Huizar can you add the Z00.0 code for the labs for the patient's physical. His insurance is requesting for it to be added for his preventative labs. Thank you.

## 2024-01-03 NOTE — TELEPHONE ENCOUNTER
I'm not sure how to add diagnosis codes on orders like that.  Absolutely I say we do it, but not sure how to.

## 2024-01-05 ENCOUNTER — OFFICE VISIT (OUTPATIENT)
Dept: FAMILY MEDICINE CLINIC | Facility: CLINIC | Age: 44
End: 2024-01-05
Payer: COMMERCIAL

## 2024-01-05 VITALS
SYSTOLIC BLOOD PRESSURE: 122 MMHG | WEIGHT: 193 LBS | BODY MASS INDEX: 26.14 KG/M2 | HEIGHT: 72 IN | DIASTOLIC BLOOD PRESSURE: 80 MMHG | OXYGEN SATURATION: 97 % | RESPIRATION RATE: 16 BRPM | HEART RATE: 76 BPM

## 2024-01-05 DIAGNOSIS — E78.5 HYPERLIPIDEMIA WITH TARGET LDL LESS THAN 130: ICD-10-CM

## 2024-01-05 DIAGNOSIS — R73.01 ELEVATED FASTING GLUCOSE: ICD-10-CM

## 2024-01-05 DIAGNOSIS — Z82.49 FAMILY HISTORY OF HEART DISEASE: ICD-10-CM

## 2024-01-05 DIAGNOSIS — Z00.00 ANNUAL PHYSICAL EXAM: Primary | ICD-10-CM

## 2024-01-05 DIAGNOSIS — I10 ESSENTIAL HYPERTENSION: ICD-10-CM

## 2024-01-05 PROCEDURE — 3008F BODY MASS INDEX DOCD: CPT | Performed by: FAMILY MEDICINE

## 2024-01-05 PROCEDURE — 3074F SYST BP LT 130 MM HG: CPT | Performed by: FAMILY MEDICINE

## 2024-01-05 PROCEDURE — 99214 OFFICE O/P EST MOD 30 MIN: CPT | Performed by: FAMILY MEDICINE

## 2024-01-05 PROCEDURE — 99396 PREV VISIT EST AGE 40-64: CPT | Performed by: FAMILY MEDICINE

## 2024-01-05 PROCEDURE — 3079F DIAST BP 80-89 MM HG: CPT | Performed by: FAMILY MEDICINE

## 2024-01-05 RX ORDER — GABAPENTIN 100 MG/1
100 CAPSULE ORAL
COMMUNITY
Start: 2023-09-25

## 2024-01-05 RX ORDER — TRAZODONE HYDROCHLORIDE 50 MG/1
50 TABLET ORAL NIGHTLY PRN
COMMUNITY
Start: 2023-09-26 | End: 2024-01-05

## 2024-01-05 RX ORDER — ROSUVASTATIN CALCIUM 10 MG/1
10 TABLET, COATED ORAL NIGHTLY
Qty: 90 TABLET | Refills: 3 | Status: SHIPPED | OUTPATIENT
Start: 2024-01-05

## 2024-01-05 RX ORDER — ESCITALOPRAM OXALATE 10 MG/1
10 TABLET ORAL DAILY
COMMUNITY
Start: 2023-09-25

## 2024-01-05 RX ORDER — HYDROCHLOROTHIAZIDE 25 MG/1
25 TABLET ORAL DAILY
Qty: 90 TABLET | Refills: 3 | Status: SHIPPED | OUTPATIENT
Start: 2024-01-05

## 2024-01-05 NOTE — PROGRESS NOTES
Chief Complaint   Patient presents with    Physical     Patient here for physical      HPI:   Jhonatan Buckley is a 43 year old male who presents for a complete physical exam.     Last colonoscopy:  N/a - screening at 47yo   Last PSA:  N/a - screening at 51   Immunizations: TDaP 2014.      HTN - BP controlled here and at home.  Taking HCTZ.      HLD - on rosuvastatin.  Trending up over time.  Diet up and down.      Sugars - A1C 5.8.      Anxiety - on lexapro and gabapentin.  Also seeing therapist.  This is helpful.      Wt Readings from Last 6 Encounters:   01/05/24 193 lb (87.5 kg)   01/08/23 190 lb (86.2 kg)   10/18/22 195 lb (88.5 kg)   07/30/21 187 lb 9.6 oz (85.1 kg)   06/19/20 196 lb (88.9 kg)   07/06/19 198 lb (89.8 kg)     Body mass index is 26.18 kg/m².     Chemistry Labs:   Lab Results   Component Value Date/Time     (H) 01/03/2024 07:36 AM     01/03/2024 07:36 AM    K 3.9 01/03/2024 07:36 AM     01/03/2024 07:36 AM    CO2 33.0 (H) 01/03/2024 07:36 AM    CREATSERUM 1.23 01/03/2024 07:36 AM    CA 9.2 01/03/2024 07:36 AM    ALB 4.2 01/03/2024 07:36 AM    TP 7.5 01/03/2024 07:36 AM    ALKPHO 70 01/03/2024 07:36 AM    AST 22 01/03/2024 07:36 AM    ALT 45 01/03/2024 07:36 AM    BILT 0.4 01/03/2024 07:36 AM          Cholesterol  (most recent labs)   Lab Results   Component Value Date/Time    CHOLEST 192 01/03/2024 07:36 AM    HDL 45 01/03/2024 07:36 AM     (H) 01/03/2024 07:36 AM    TRIG 172 (H) 01/03/2024 07:36 AM      No results found for: \"PSA\"      Current Outpatient Medications   Medication Sig Dispense Refill    escitalopram 10 MG Oral Tab Take 1 tablet (10 mg total) by mouth daily.      gabapentin 100 MG Oral Cap Take 1 capsule (100 mg total) by mouth.      traZODone 50 MG Oral Tab Take 1 tablet (50 mg total) by mouth nightly as needed. AT BEDTIME      rosuvastatin 10 MG Oral Tab TAKE 1 TABLET NIGHTLY 90 tablet 0    hydroCHLOROthiazide 25 MG Oral Tab Take 1 tablet (25 mg  total) by mouth daily. 90 tablet 0    Multiple Vitamin (MULTI-DAY) Oral Tab Take by mouth daily.      methylPREDNISolone (MEDROL) 4 MG Oral Tablet Therapy Pack Dosepack: take as directed 21 tablet 0    albuterol 108 (90 Base) MCG/ACT Inhalation Aero Soln Inhale 2 puffs into the lungs every 4 (four) hours as needed for Wheezing. 18 g 0    guaiFENesin-codeine (CHERATUSSIN AC) 100-10 MG/5ML Oral Solution Take 5 mL by mouth every 6 (six) hours as needed for cough. 180 mL 0    Zinc 100 MG Oral Tab Take by mouth daily.        Past Medical History:   Diagnosis Date    Essential hypertension     Hyperlipidemia       Past Surgical History:   Procedure Laterality Date    OTHER  2013    \"nasal surgery\"      Family History   Problem Relation Age of Onset    Heart Disorder Father     Lipids Father     Hypertension Father     Heart Disorder Maternal Grandfather     Heart Disorder Paternal Grandfather       Social History:  Social History     Socioeconomic History    Marital status:    Occupational History    Occupation: Marketing    Tobacco Use    Smoking status: Former     Years: 5     Types: Cigarettes     Quit date: 2016     Years since quittin.3    Smokeless tobacco: Never    Tobacco comments:     in college, very little   Vaping Use    Vaping Use: Never used   Substance and Sexual Activity    Alcohol use: Yes     Alcohol/week: 4.0 standard drinks of alcohol     Types: 4 Standard drinks or equivalent per week     Comment: occ    Drug use: No   Other Topics Concern     Service No    Caffeine Concern Yes     Comment: 2 cups coffee daily    Sleep Concern No    Exercise Yes     Comment: 2-3 times weekly-weights,running    Seat Belt Yes      Occ: [employer] - marketing. .   : yes. Children: Yaa Brooke Joseph  Exercise: more on the weekends   Diet: up and down.      REVIEW OF SYSTEMS:     All systems reviewed, negative other than noted above.    EXAM:   /80   Pulse 76   Resp 16    Ht 6' (1.829 m)   Wt 193 lb (87.5 kg)   SpO2 97%   BMI 26.18 kg/m²   Body mass index is 26.18 kg/m².     General appearance: alert, appears stated age and cooperative  Eyes: conjunctivae/corneas clear. PERRL, EOM's intact.   Ears: normal TM's and external ear canals both ears  Neck: no adenopathy, no JVD, supple, symmetrical, trachea midline and thyroid not enlarged, symmetric, no tenderness/mass/nodules  Lungs: clear to auscultation bilaterally  Heart: S1, S2 normal, no murmur, click, rub or gallop, regular rate and rhythm  Abdomen: soft, non-tender; bowel sounds normal; no masses,  no organomegaly  Extremities: extremities normal, atraumatic, no cyanosis or edema  Pulses: 2+ and symmetric  Neurologic: Alert and oriented X 3, normal strength and tone. Normal symmetric reflexes. Normal coordination and gait     ASSESSMENT AND PLAN:     Jhonatan Buckley was seen in the office today:  had concerns including Physical (Patient here for physical).    1. Annual physical exam  Overall fair  Medical conditions are controlled with the medications  Too young for colonoscopy  PSA at 51    2. Essential hypertension  Blood pressure controlled here and at home  Continue hydrochlorothiazide at 25 mg  - hydroCHLOROthiazide 25 MG Oral Tab; Take 1 tablet (25 mg total) by mouth daily.  Dispense: 90 tablet; Refill: 3    3. Hyperlipidemia with target LDL less than 130  On statin.  Over time, cholesterol levels have risen.  Still not back to where they were premedicine  Will focus on diet primarily at this point in time  If this persist, can increase the rosuvastatin dose  - rosuvastatin 10 MG Oral Tab; Take 1 tablet (10 mg total) by mouth nightly.  Dispense: 90 tablet; Refill: 3    4. Family history of heart disease  Working on controlling risk factors  Did have an uncle who did coronary artery calcium score with significantly elevated numbers and needed stenting  He is interested in pursuing this.  We discussed the procedure for  setting this up  - rosuvastatin 10 MG Oral Tab; Take 1 tablet (10 mg total) by mouth nightly.  Dispense: 90 tablet; Refill: 3    5. Elevated fasting glucose  Sugars remain in the prediabetic range  Focus on food choices, and getting courses down a little bit  Stay active        Tom Huizar M.D.   [facility]  01/05/24    RTC annually.

## 2024-01-22 ENCOUNTER — HOSPITAL ENCOUNTER (OUTPATIENT)
Dept: CT IMAGING | Age: 44
Discharge: HOME | End: 2024-01-22
Attending: FAMILY MEDICINE

## 2024-01-22 DIAGNOSIS — Z13.6 SCREENING FOR HEART DISEASE: ICD-10-CM

## 2024-12-27 ENCOUNTER — PATIENT MESSAGE (OUTPATIENT)
Dept: FAMILY MEDICINE CLINIC | Facility: CLINIC | Age: 44
End: 2024-12-27

## 2024-12-27 DIAGNOSIS — Z00.00 LABORATORY EXAM ORDERED AS PART OF ROUTINE GENERAL MEDICAL EXAMINATION: Primary | ICD-10-CM

## 2025-01-17 DIAGNOSIS — E78.5 HYPERLIPIDEMIA WITH TARGET LDL LESS THAN 130: ICD-10-CM

## 2025-01-17 DIAGNOSIS — Z82.49 FAMILY HISTORY OF HEART DISEASE: ICD-10-CM

## 2025-01-20 RX ORDER — ROSUVASTATIN CALCIUM 10 MG/1
10 TABLET, COATED ORAL NIGHTLY
Qty: 90 TABLET | Refills: 0 | Status: SHIPPED | OUTPATIENT
Start: 2025-01-20

## 2025-01-20 NOTE — TELEPHONE ENCOUNTER
Requested Prescriptions     Signed Prescriptions Disp Refills    rosuvastatin 10 MG Oral Tab 90 tablet 0     Sig: TAKE 1 TABLET NIGHTLY     Authorizing Provider: GUERRERO GOULD     Ordering User: NIRANJAN SNOW      Refilled per protocol/OV notes

## 2025-01-27 DIAGNOSIS — I10 ESSENTIAL HYPERTENSION: ICD-10-CM

## 2025-01-28 RX ORDER — HYDROCHLOROTHIAZIDE 25 MG/1
25 TABLET ORAL DAILY
Qty: 90 TABLET | Refills: 0 | Status: SHIPPED | OUTPATIENT
Start: 2025-01-28

## 2025-01-28 NOTE — TELEPHONE ENCOUNTER
Requested Prescriptions     Pending Prescriptions Disp Refills    HYDROCHLOROTHIAZIDE 25 MG Oral Tab [Pharmacy Med Name: HYDROCHLOROTHIAZIDE TABS 25MG] 90 tablet 3     Sig: TAKE 1 TABLET DAILY     LOV Visit date not found     Patient was asked to follow-up in: 1 year    Appointment scheduled: 2/4/2025 Tom Huizar MD     Medication refilled per protocol.

## 2025-01-31 ENCOUNTER — LAB ENCOUNTER (OUTPATIENT)
Dept: LAB | Age: 45
End: 2025-01-31
Attending: FAMILY MEDICINE
Payer: COMMERCIAL

## 2025-01-31 DIAGNOSIS — Z00.00 LABORATORY EXAM ORDERED AS PART OF ROUTINE GENERAL MEDICAL EXAMINATION: ICD-10-CM

## 2025-01-31 LAB
ALBUMIN SERPL-MCNC: 4.6 G/DL (ref 3.2–4.8)
ALBUMIN/GLOB SERPL: 1.6 {RATIO} (ref 1–2)
ALP LIVER SERPL-CCNC: 81 U/L
ALT SERPL-CCNC: 28 U/L
ANION GAP SERPL CALC-SCNC: 7 MMOL/L (ref 0–18)
AST SERPL-CCNC: 19 U/L (ref ?–34)
BASOPHILS # BLD AUTO: 0.09 X10(3) UL (ref 0–0.2)
BASOPHILS NFR BLD AUTO: 1.2 %
BILIRUB SERPL-MCNC: 0.4 MG/DL (ref 0.3–1.2)
BUN BLD-MCNC: 16 MG/DL (ref 9–23)
CALCIUM BLD-MCNC: 9.5 MG/DL (ref 8.7–10.6)
CHLORIDE SERPL-SCNC: 101 MMOL/L (ref 98–112)
CHOLEST SERPL-MCNC: 188 MG/DL (ref ?–200)
CO2 SERPL-SCNC: 30 MMOL/L (ref 21–32)
CREAT BLD-MCNC: 1.25 MG/DL
EGFRCR SERPLBLD CKD-EPI 2021: 73 ML/MIN/1.73M2 (ref 60–?)
EOSINOPHIL # BLD AUTO: 0.15 X10(3) UL (ref 0–0.7)
EOSINOPHIL NFR BLD AUTO: 1.9 %
ERYTHROCYTE [DISTWIDTH] IN BLOOD BY AUTOMATED COUNT: 12.1 %
EST. AVERAGE GLUCOSE BLD GHB EST-MCNC: 117 MG/DL (ref 68–126)
FASTING PATIENT LIPID ANSWER: YES
FASTING STATUS PATIENT QL REPORTED: YES
GLOBULIN PLAS-MCNC: 2.8 G/DL (ref 2–3.5)
GLUCOSE BLD-MCNC: 101 MG/DL (ref 70–99)
HBA1C MFR BLD: 5.7 % (ref ?–5.7)
HCT VFR BLD AUTO: 43.6 %
HDLC SERPL-MCNC: 39 MG/DL (ref 40–59)
HGB BLD-MCNC: 15.1 G/DL
IMM GRANULOCYTES # BLD AUTO: 0.02 X10(3) UL (ref 0–1)
IMM GRANULOCYTES NFR BLD: 0.3 %
LDLC SERPL CALC-MCNC: 120 MG/DL (ref ?–100)
LYMPHOCYTES # BLD AUTO: 2.91 X10(3) UL (ref 1–4)
LYMPHOCYTES NFR BLD AUTO: 37.6 %
MCH RBC QN AUTO: 30.3 PG (ref 26–34)
MCHC RBC AUTO-ENTMCNC: 34.6 G/DL (ref 31–37)
MCV RBC AUTO: 87.6 FL
MONOCYTES # BLD AUTO: 0.61 X10(3) UL (ref 0.1–1)
MONOCYTES NFR BLD AUTO: 7.9 %
NEUTROPHILS # BLD AUTO: 3.95 X10 (3) UL (ref 1.5–7.7)
NEUTROPHILS # BLD AUTO: 3.95 X10(3) UL (ref 1.5–7.7)
NEUTROPHILS NFR BLD AUTO: 51.1 %
NONHDLC SERPL-MCNC: 149 MG/DL (ref ?–130)
OSMOLALITY SERPL CALC.SUM OF ELEC: 287 MOSM/KG (ref 275–295)
PLATELET # BLD AUTO: 368 10(3)UL (ref 150–450)
POTASSIUM SERPL-SCNC: 4.4 MMOL/L (ref 3.5–5.1)
PROT SERPL-MCNC: 7.4 G/DL (ref 5.7–8.2)
RBC # BLD AUTO: 4.98 X10(6)UL
SODIUM SERPL-SCNC: 138 MMOL/L (ref 136–145)
TRIGL SERPL-MCNC: 161 MG/DL (ref 30–149)
TSI SER-ACNC: 1.85 UIU/ML (ref 0.55–4.78)
VLDLC SERPL CALC-MCNC: 28 MG/DL (ref 0–30)
WBC # BLD AUTO: 7.7 X10(3) UL (ref 4–11)

## 2025-01-31 PROCEDURE — 80053 COMPREHEN METABOLIC PANEL: CPT

## 2025-01-31 PROCEDURE — 80061 LIPID PANEL: CPT

## 2025-01-31 PROCEDURE — 85025 COMPLETE CBC W/AUTO DIFF WBC: CPT

## 2025-01-31 PROCEDURE — 83036 HEMOGLOBIN GLYCOSYLATED A1C: CPT

## 2025-01-31 PROCEDURE — 36415 COLL VENOUS BLD VENIPUNCTURE: CPT

## 2025-01-31 PROCEDURE — 84443 ASSAY THYROID STIM HORMONE: CPT

## 2025-02-04 ENCOUNTER — OFFICE VISIT (OUTPATIENT)
Dept: FAMILY MEDICINE CLINIC | Facility: CLINIC | Age: 45
End: 2025-02-04
Payer: COMMERCIAL

## 2025-02-04 VITALS
DIASTOLIC BLOOD PRESSURE: 85 MMHG | HEIGHT: 72 IN | SYSTOLIC BLOOD PRESSURE: 130 MMHG | RESPIRATION RATE: 16 BRPM | BODY MASS INDEX: 26.99 KG/M2 | OXYGEN SATURATION: 97 % | WEIGHT: 199.25 LBS | HEART RATE: 70 BPM

## 2025-02-04 DIAGNOSIS — E78.5 HYPERLIPIDEMIA WITH TARGET LDL LESS THAN 130: ICD-10-CM

## 2025-02-04 DIAGNOSIS — Z00.00 ANNUAL PHYSICAL EXAM: Primary | ICD-10-CM

## 2025-02-04 DIAGNOSIS — I10 ESSENTIAL HYPERTENSION: ICD-10-CM

## 2025-02-04 DIAGNOSIS — Z82.49 FAMILY HISTORY OF HEART DISEASE: ICD-10-CM

## 2025-02-04 DIAGNOSIS — B07.0 PLANTAR WART OF LEFT FOOT: ICD-10-CM

## 2025-02-04 DIAGNOSIS — R73.01 ELEVATED FASTING GLUCOSE: ICD-10-CM

## 2025-02-04 DIAGNOSIS — Z23 NEED FOR DIPHTHERIA-TETANUS-PERTUSSIS (TDAP) VACCINE: ICD-10-CM

## 2025-02-04 PROCEDURE — 99213 OFFICE O/P EST LOW 20 MIN: CPT | Performed by: FAMILY MEDICINE

## 2025-02-04 PROCEDURE — 90471 IMMUNIZATION ADMIN: CPT | Performed by: FAMILY MEDICINE

## 2025-02-04 PROCEDURE — 99396 PREV VISIT EST AGE 40-64: CPT | Performed by: FAMILY MEDICINE

## 2025-02-04 PROCEDURE — 90715 TDAP VACCINE 7 YRS/> IM: CPT | Performed by: FAMILY MEDICINE

## 2025-02-04 RX ORDER — ROSUVASTATIN CALCIUM 10 MG/1
10 TABLET, COATED ORAL NIGHTLY
Qty: 90 TABLET | Refills: 3 | Status: SHIPPED | OUTPATIENT
Start: 2025-02-04

## 2025-02-04 RX ORDER — TRAZODONE HYDROCHLORIDE 50 MG/1
50 TABLET, FILM COATED ORAL NIGHTLY PRN
COMMUNITY
Start: 2024-09-03

## 2025-02-04 RX ORDER — HYDROCHLOROTHIAZIDE 25 MG/1
25 TABLET ORAL DAILY
Qty: 90 TABLET | Refills: 3 | Status: SHIPPED | OUTPATIENT
Start: 2025-02-04

## 2025-02-04 NOTE — PROGRESS NOTES
Chief Complaint   Patient presents with    Well Adult     Patient here for physical       HPI:   Jhonatan Buckley is a 44 year old male who presents for a complete physical exam.     Last colonoscopy:  N/a - screening at 45yo   Last PSA:  N/a - screening at 51   Immunizations: TDaP 2014.  Flu UTD.  COVID UTD.     HTN - taking HCTZ 25mg.  /90 initially today.      HLD - on rosuvastatin.  Very mild CACS a year ago, score of 1.      Pre-DM - A1C at 5.7.  improved from a year ago, 5.8.      Wt Readings from Last 6 Encounters:   02/04/25 199 lb 4 oz (90.4 kg)   01/05/24 193 lb (87.5 kg)   01/08/23 190 lb (86.2 kg)   10/18/22 195 lb (88.5 kg)   07/30/21 187 lb 9.6 oz (85.1 kg)   06/19/20 196 lb (88.9 kg)     Body mass index is 27.02 kg/m².     Chemistry Labs:   Lab Results   Component Value Date/Time     (H) 01/31/2025 08:36 AM     01/31/2025 08:36 AM    K 4.4 01/31/2025 08:36 AM     01/31/2025 08:36 AM    CO2 30.0 01/31/2025 08:36 AM    CREATSERUM 1.25 01/31/2025 08:36 AM    CA 9.5 01/31/2025 08:36 AM    ALB 4.6 01/31/2025 08:36 AM    TP 7.4 01/31/2025 08:36 AM    ALKPHO 81 01/31/2025 08:36 AM    AST 19 01/31/2025 08:36 AM    ALT 28 01/31/2025 08:36 AM    BILT 0.4 01/31/2025 08:36 AM          Cholesterol  (most recent labs)   Lab Results   Component Value Date/Time    CHOLEST 188 01/31/2025 08:36 AM    HDL 39 (L) 01/31/2025 08:36 AM     (H) 01/31/2025 08:36 AM    TRIG 161 (H) 01/31/2025 08:36 AM      No results found for: \"PSA\"      Current Outpatient Medications   Medication Sig Dispense Refill    traZODone 50 MG Oral Tab Take 1 tablet (50 mg total) by mouth nightly as needed. AT BEDTIME      rosuvastatin 10 MG Oral Tab Take 1 tablet (10 mg total) by mouth nightly. 90 tablet 3    hydroCHLOROthiazide 25 MG Oral Tab Take 1 tablet (25 mg total) by mouth daily. 90 tablet 3    escitalopram 10 MG Oral Tab Take 1 tablet (10 mg total) by mouth daily.      gabapentin 100 MG Oral Cap Take 1  capsule (100 mg total) by mouth.      Multiple Vitamin (MULTI-DAY) Oral Tab Take by mouth daily.        Past Medical History:    Essential hypertension    Hyperlipidemia      Past Surgical History:   Procedure Laterality Date    Other  2013    \"nasal surgery\"      Family History   Problem Relation Age of Onset    Heart Disorder Father     Lipids Father     Hypertension Father     Heart Disorder Maternal Grandfather     Heart Disorder Paternal Grandfather       Social History:  Social History     Socioeconomic History    Marital status:    Occupational History    Occupation: Marketing    Tobacco Use    Smoking status: Former     Current packs/day: 0.00     Types: Cigarettes     Start date: 2011     Quit date: 2016     Years since quittin.4    Smokeless tobacco: Never    Tobacco comments:     in college, very little   Vaping Use    Vaping status: Never Used   Substance and Sexual Activity    Alcohol use: Yes     Alcohol/week: 4.0 standard drinks of alcohol     Types: 4 Standard drinks or equivalent per week     Comment: occ    Drug use: No   Other Topics Concern     Service No    Caffeine Concern Yes     Comment: 2 cups coffee daily    Sleep Concern No    Exercise Yes     Comment: 2-3 times weekly-weights,running    Seat Belt Yes     Social Drivers of Health     Food Insecurity: No Food Insecurity (2025)    NCSS - Food Insecurity     Worried About Running Out of Food in the Last Year: No     Ran Out of Food in the Last Year: No   Transportation Needs: No Transportation Needs (2025)    NCSS - Transportation     Lack of Transportation: No   Housing Stability: Not At Risk (2025)    NCSS - Housing/Utilities     Has Housing: Yes     Worried About Losing Housing: No     Unable to Get Utilities: No      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   : yes. Children: Mendy, Yaa, Jhonatan.   Exercise: some  Diet: fair.      REVIEW OF SYSTEMS:     All systems reviewed, negative other  than noted above.    EXAM:   /85   Pulse 70   Resp 16   Ht 6' (1.829 m)   Wt 199 lb 4 oz (90.4 kg)   SpO2 97%   BMI 27.02 kg/m²   Body mass index is 27.02 kg/m².     General appearance: alert, appears stated age and cooperative  Eyes: conjunctivae/corneas clear. PERRL, EOM's intact.   Ears: normal TM's and external ear canals both ears  Neck: no adenopathy, no JVD, supple, symmetrical, trachea midline and thyroid not enlarged, symmetric, no tenderness/mass/nodules  Lungs: clear to auscultation bilaterally  Heart: S1, S2 normal, no murmur, click, rub or gallop, regular rate and rhythm  Abdomen: soft, non-tender; bowel sounds normal; no masses,  no organomegaly  Extremities: extremities normal, atraumatic, no cyanosis or edema  Pulses: 2+ and symmetric  Neurologic: Alert and oriented X 3, normal strength and tone. Normal symmetric reflexes. Normal coordination and gait     Pt consented for wart removal.  Discussed risk of bleeding, injury to surrounding issue, infection.  Agrees to proceed.  A scalpal blade was used to excise the excess skin, down to flush with normal skin of the foot.  Using liquid nitrogen, 3 rounds of freeze-thaw were performed.  Pt tolerated the procedure well.  No significant bleeding.     ASSESSMENT AND PLAN:     Jhonatan Buckley was seen in the office today:  had concerns including Well Adult (Patient here for physical ).    1. Annual physical exam  Overall well  Weight up a little bit.  Sugar still little higher than desired.  Work on getting this back down  Labs otherwise reassuring.  Colon screening at 46  Shots up-to-date.  Tdap today    2. Hyperlipidemia with target LDL less than 130  Lipids controlled on recent labs  Continue Crestor.  Medicine refilled  - rosuvastatin 10 MG Oral Tab; Take 1 tablet (10 mg total) by mouth nightly.  Dispense: 90 tablet; Refill: 3    3. Family history of heart disease  Family history.  Coronary artery calcium score of 1 a year ago.  This is  reassuring  - rosuvastatin 10 MG Oral Tab; Take 1 tablet (10 mg total) by mouth nightly.  Dispense: 90 tablet; Refill: 3    4. Essential hypertension  Blood pressure high normal on initial check better on recheck  Will continue the hydrochlorothiazide.  Medicine refilled  - hydroCHLOROthiazide 25 MG Oral Tab; Take 1 tablet (25 mg total) by mouth daily.  Dispense: 90 tablet; Refill: 3    5. Need for diphtheria-tetanus-pertussis (Tdap) vaccine  Given  - Tdap (Adacel, Boostrix) [25697]    6. Elevated fasting glucose  Mild.  Actually improved some from last year with an A1c now 5.7  Continue to watch carbs.  Getting weight down to help.    7. Plantar wart of left foot  Status post cutting debridement and freezing.  Recommend he pick it up again at home, continue to whittle this down                Note to patient: The 21 Century Cures Act makes medical notes like these available to patients in the interest of transparency. However, be advised this is a medical document. It is intended as peer to peer communication. It is written in medical language and may contain abbreviations or verbiage that are unfamiliar. It may appear blunt or direct. Medical documents are intended to carry relevant information, facts as evident, and the clinical opinion of the practitioner.         Tom Huizar M.D.   [facility] 3  02/04/25

## (undated) DIAGNOSIS — Z82.49 FAMILY HISTORY OF HEART DISEASE: ICD-10-CM

## (undated) DIAGNOSIS — I10 ESSENTIAL HYPERTENSION: ICD-10-CM

## (undated) DIAGNOSIS — E78.5 HYPERLIPIDEMIA WITH TARGET LDL LESS THAN 130: ICD-10-CM